# Patient Record
Sex: MALE | Race: BLACK OR AFRICAN AMERICAN | NOT HISPANIC OR LATINO | Employment: OTHER | URBAN - METROPOLITAN AREA
[De-identification: names, ages, dates, MRNs, and addresses within clinical notes are randomized per-mention and may not be internally consistent; named-entity substitution may affect disease eponyms.]

---

## 2023-10-30 ENCOUNTER — HOSPITAL ENCOUNTER (EMERGENCY)
Facility: HOSPITAL | Age: 63
Discharge: HOME | End: 2023-10-30
Attending: INTERNAL MEDICINE

## 2023-10-30 VITALS
BODY MASS INDEX: 26.66 KG/M2 | DIASTOLIC BLOOD PRESSURE: 82 MMHG | WEIGHT: 180 LBS | SYSTOLIC BLOOD PRESSURE: 159 MMHG | TEMPERATURE: 99 F | RESPIRATION RATE: 18 BRPM | OXYGEN SATURATION: 97 % | HEIGHT: 69 IN | HEART RATE: 96 BPM

## 2023-10-30 DIAGNOSIS — R06.6 INTRACTABLE HICCUPS: ICD-10-CM

## 2023-10-30 DIAGNOSIS — K52.9 ENTERITIS: Primary | ICD-10-CM

## 2023-10-30 DIAGNOSIS — K56.7 ILEUS: ICD-10-CM

## 2023-10-30 LAB
ALBUMIN SERPL-MCNC: 3.4 G/DL (ref 3.4–4.8)
ALBUMIN/GLOB SERPL: 1.1 RATIO (ref 1.1–2)
ALP SERPL-CCNC: 60 UNIT/L (ref 40–150)
ALT SERPL-CCNC: 76 UNIT/L (ref 0–55)
AMPHET UR QL SCN: NEGATIVE
APPEARANCE UR: CLEAR
AST SERPL-CCNC: 40 UNIT/L (ref 5–34)
BACTERIA #/AREA URNS AUTO: ABNORMAL /HPF
BARBITURATE SCN PRESENT UR: NEGATIVE
BASOPHILS # BLD AUTO: 0.06 X10(3)/MCL
BASOPHILS NFR BLD AUTO: 0.7 %
BENZODIAZ UR QL SCN: NEGATIVE
BILIRUB SERPL-MCNC: 0.6 MG/DL
BILIRUB UR QL STRIP.AUTO: NEGATIVE
BUN SERPL-MCNC: 12.3 MG/DL (ref 8.4–25.7)
CALCIUM SERPL-MCNC: 9.2 MG/DL (ref 8.8–10)
CANNABINOIDS UR QL SCN: NEGATIVE
CHLORIDE SERPL-SCNC: 103 MMOL/L (ref 98–107)
CO2 SERPL-SCNC: 28 MMOL/L (ref 23–31)
COCAINE UR QL SCN: NEGATIVE
COLOR UR AUTO: YELLOW
CREAT SERPL-MCNC: 1.2 MG/DL (ref 0.73–1.18)
EOSINOPHIL # BLD AUTO: 0.08 X10(3)/MCL (ref 0–0.9)
EOSINOPHIL NFR BLD AUTO: 0.9 %
ERYTHROCYTE [DISTWIDTH] IN BLOOD BY AUTOMATED COUNT: 13.2 % (ref 11.5–17)
FENTANYL UR QL SCN: NEGATIVE
GFR SERPLBLD CREATININE-BSD FMLA CKD-EPI: >60 MLS/MIN/1.73/M2
GLOBULIN SER-MCNC: 3.2 GM/DL (ref 2.4–3.5)
GLUCOSE SERPL-MCNC: 140 MG/DL (ref 82–115)
GLUCOSE UR QL STRIP.AUTO: NEGATIVE
HCT VFR BLD AUTO: 45.7 % (ref 42–52)
HGB BLD-MCNC: 15.9 G/DL (ref 14–18)
HYALINE CASTS URNS QL MICRO: ABNORMAL /LPF
IMM GRANULOCYTES # BLD AUTO: 0.29 X10(3)/MCL (ref 0–0.04)
IMM GRANULOCYTES NFR BLD AUTO: 3.3 %
INR PPP: 1.1 (ref 2–3)
KETONES UR QL STRIP.AUTO: ABNORMAL
LEUKOCYTE ESTERASE UR QL STRIP.AUTO: NEGATIVE
LIPASE SERPL-CCNC: 99 U/L
LYMPHOCYTES # BLD AUTO: 2.56 X10(3)/MCL (ref 0.6–4.6)
LYMPHOCYTES NFR BLD AUTO: 28.7 %
MAGNESIUM SERPL-MCNC: 2.3 MG/DL (ref 1.6–2.6)
MCH RBC QN AUTO: 31.4 PG (ref 27–31)
MCHC RBC AUTO-ENTMCNC: 34.8 G/DL (ref 33–36)
MCV RBC AUTO: 90.1 FL (ref 80–94)
MDMA UR QL SCN: NEGATIVE
MONOCYTES # BLD AUTO: 1.13 X10(3)/MCL (ref 0.1–1.3)
MONOCYTES NFR BLD AUTO: 12.7 %
NEUTROPHILS # BLD AUTO: 4.79 X10(3)/MCL (ref 2.1–9.2)
NEUTROPHILS NFR BLD AUTO: 53.7 %
NITRITE UR QL STRIP.AUTO: NEGATIVE
NRBC BLD AUTO-RTO: 0 %
OPIATES UR QL SCN: NEGATIVE
PCP UR QL: NEGATIVE
PH UR STRIP.AUTO: 6 [PH]
PH UR: 6 [PH] (ref 3–11)
PLATELET # BLD AUTO: 248 X10(3)/MCL (ref 130–400)
PMV BLD AUTO: 9.5 FL (ref 7.4–10.4)
POTASSIUM SERPL-SCNC: 3.7 MMOL/L (ref 3.5–5.1)
PROT SERPL-MCNC: 6.6 GM/DL (ref 5.8–7.6)
PROT UR QL STRIP.AUTO: NEGATIVE
PROTHROMBIN TIME: 13.9 SECONDS (ref 11.7–14.5)
RBC # BLD AUTO: 5.07 X10(6)/MCL (ref 4.7–6.1)
RBC #/AREA URNS AUTO: ABNORMAL /HPF
RBC UR QL AUTO: ABNORMAL
SODIUM SERPL-SCNC: 138 MMOL/L (ref 136–145)
SP GR UR STRIP.AUTO: 1.02 (ref 1–1.03)
SQUAMOUS #/AREA URNS AUTO: ABNORMAL /HPF
TROPONIN I SERPL-MCNC: <0.01 NG/ML (ref 0–0.04)
UROBILINOGEN UR STRIP-ACNC: 1
WBC # SPEC AUTO: 8.91 X10(3)/MCL (ref 4.5–11.5)
WBC #/AREA URNS AUTO: ABNORMAL /HPF

## 2023-10-30 PROCEDURE — 99285 EMERGENCY DEPT VISIT HI MDM: CPT | Mod: 25

## 2023-10-30 PROCEDURE — 85610 PROTHROMBIN TIME: CPT | Performed by: INTERNAL MEDICINE

## 2023-10-30 PROCEDURE — 63600175 PHARM REV CODE 636 W HCPCS: Performed by: INTERNAL MEDICINE

## 2023-10-30 PROCEDURE — 83735 ASSAY OF MAGNESIUM: CPT | Performed by: INTERNAL MEDICINE

## 2023-10-30 PROCEDURE — 96374 THER/PROPH/DIAG INJ IV PUSH: CPT

## 2023-10-30 PROCEDURE — 80053 COMPREHEN METABOLIC PANEL: CPT | Performed by: INTERNAL MEDICINE

## 2023-10-30 PROCEDURE — 83690 ASSAY OF LIPASE: CPT | Performed by: INTERNAL MEDICINE

## 2023-10-30 PROCEDURE — 80307 DRUG TEST PRSMV CHEM ANLYZR: CPT | Performed by: INTERNAL MEDICINE

## 2023-10-30 PROCEDURE — 81001 URINALYSIS AUTO W/SCOPE: CPT | Mod: XB | Performed by: INTERNAL MEDICINE

## 2023-10-30 PROCEDURE — 84484 ASSAY OF TROPONIN QUANT: CPT | Performed by: INTERNAL MEDICINE

## 2023-10-30 PROCEDURE — 25000003 PHARM REV CODE 250: Performed by: INTERNAL MEDICINE

## 2023-10-30 PROCEDURE — 85025 COMPLETE CBC W/AUTO DIFF WBC: CPT | Performed by: INTERNAL MEDICINE

## 2023-10-30 RX ORDER — PROMETHAZINE HYDROCHLORIDE 25 MG/1
25 TABLET ORAL EVERY 6 HOURS PRN
Qty: 15 TABLET | Refills: 0 | Status: SHIPPED | OUTPATIENT
Start: 2023-10-30

## 2023-10-30 RX ORDER — LABETALOL HCL 20 MG/4 ML
10 SYRINGE (ML) INTRAVENOUS ONCE
Status: DISCONTINUED | OUTPATIENT
Start: 2023-10-30 | End: 2023-10-30

## 2023-10-30 RX ORDER — ONDANSETRON 4 MG/1
4 TABLET, ORALLY DISINTEGRATING ORAL EVERY 6 HOURS PRN
Qty: 15 TABLET | Refills: 0 | Status: SHIPPED | OUTPATIENT
Start: 2023-10-30

## 2023-10-30 RX ORDER — METOCLOPRAMIDE HYDROCHLORIDE 5 MG/ML
5 INJECTION INTRAMUSCULAR; INTRAVENOUS
Status: COMPLETED | OUTPATIENT
Start: 2023-10-30 | End: 2023-10-30

## 2023-10-30 RX ADMIN — SODIUM CHLORIDE 1000 ML: 9 INJECTION, SOLUTION INTRAVENOUS at 08:10

## 2023-10-30 RX ADMIN — METOCLOPRAMIDE HYDROCHLORIDE 5 MG: 5 INJECTION INTRAMUSCULAR; INTRAVENOUS at 09:10

## 2023-10-30 NOTE — ED PROVIDER NOTES
"     Source of History:  Patient, no limitations    Chief complaint:  Diarrhea (C/o "black" diarrhea with some vomiting and hiccoughs X a few days. )      HPI:  Scottie Denson is a 63 y.o. male presenting with Diarrhea (C/o "black" diarrhea with some vomiting and hiccoughs X a few days. )       64yo M without hx, presents for N/V/D for last few days associated with generalized abd pain and bloating  Patient presents for evaluation of abdominal pain. Onset of symptoms was abrupt starting a few days ago with stable course since that time. The pain is located diffusely without radiation. The pain is rated as mild. The pain is made worse by food and vomiting and is relieved by noting. The patient also complains of anorexia, diarrhea, and vomiting. The patient denies fever. The past workup has included none. Home care consisted of none.        Review of Systems   Constitutional symptoms:  Negative except as documented in HPI.   Skin symptoms:  Negative except as documented in HPI.   HEENT symptoms:  Negative except as documented in HPI.   Respiratory symptoms:  Negative except as documented in HPI.   Cardiovascular symptoms:  Negative except as documented in HPI.   Gastrointestinal symptoms:  Negative except as documented in HPI.    Genitourinary symptoms:  Negative except as documented in HPI.   Musculoskeletal symptoms:  Negative except as documented in HPI.   Neurologic symptoms:  Negative except as documented in HPI.   Psychiatric symptoms:  Negative except as documented in HPI.   Allergy/immunologic symptoms:  Negative except as documented in HPI.             Additional review of systems information: All other systems reviewed and otherwise negative.      Review of patient's allergies indicates:  No Known Allergies    PMH:  As per HPI and below:    No past medical history on file.     No family history on file.    No past surgical history on file.         There is no problem list on file for this patient.   " "    Physical Exam:    BP (!) 159/82 (BP Location: Left arm, Patient Position: Lying)   Pulse 96   Temp 98.8 °F (37.1 °C) (Oral)   Resp 18   Ht 5' 9" (1.753 m)   Wt 81.6 kg (180 lb)   SpO2 97%   BMI 26.58 kg/m²     Nursing note and vital signs reviewed.    General:  Alert, no acute distress.   Skin: Normal for Ethnic Origin, No cyanosis  HEENT: Normocephalic and atraumatic, Vision unchanged, Pupils symmetric, No icterus , Nasal mucosa is pink and moist  Cardiovascular:  Regular rate and rhythm, No edema  Chest Wall: No deformity, equal chest rise  Respiratory:  Lungs are clear to auscultation, respirations are non-labored.    Musculoskeletal:  No deformity, Normal perfusion to all extremities  Gastrointestinal:  Soft, mildly distended, hypoactive BS  Neurological:  Alert and oriented, normal motor observed, normal speech observed.    Psychiatric:  Cooperative, appropriate mood & affect.        Labs that have been ordered have been independently reviewed and interpreted by myself.     Old Chart Reviewed.      Initial Impression/ Differential Dx:  GERD, intestinal spasm, gastroenteritis, gastritis, ulcer, cholecystitis, cholelithiasis, gallstones, pancreatitis, ileus, small bowel obstruction, appendicitis, diverticulitis, colitis, constipation, intestinal gas pain.      MDM:      Reviewed Nurses Note.    Reviewed Pertinent old records.    Orders Placed This Encounter    X-Ray Abdomen Flat And Erect    CT Abdomen Pelvis  Without Contrast    CBC Auto Differential    Comprehensive Metabolic Panel    Protime-INR    Lipase    Magnesium    Troponin I    CBC with Differential    Urinalysis, Reflex to Urine Culture    Drug Screen, Urine    Urinalysis, Microscopic    Insert peripheral IV    sodium chloride 0.9% bolus 1,000 mL 1,000 mL    metoclopramide HCl injection 5 mg    ondansetron (ZOFRAN-ODT) 4 MG TbDL    promethazine (PHENERGAN) 25 MG tablet                    Labs Reviewed   COMPREHENSIVE METABOLIC PANEL - " Abnormal; Notable for the following components:       Result Value    Glucose Level 140 (*)     Creatinine 1.20 (*)     Alanine Aminotransferase 76 (*)     Aspartate Aminotransferase 40 (*)     All other components within normal limits   PROTIME-INR - Abnormal; Notable for the following components:    INR 1.1 (*)     All other components within normal limits   LIPASE - Abnormal; Notable for the following components:    Lipase Level 99 (*)     All other components within normal limits   CBC WITH DIFFERENTIAL - Abnormal; Notable for the following components:    MCH 31.4 (*)     IG# 0.29 (*)     All other components within normal limits   URINALYSIS, REFLEX TO URINE CULTURE - Abnormal; Notable for the following components:    Ketones, UA Trace (*)     Blood, UA Small (*)     All other components within normal limits   URINALYSIS, MICROSCOPIC - Abnormal; Notable for the following components:    Hyaline Casts, UA Rare (*)     All other components within normal limits   MAGNESIUM - Normal   TROPONIN I - Normal   DRUG SCREEN, URINE (BEAKER) - Normal    Narrative:     Cut off concentrations:    Amphetamines - 1000 ng/ml  Barbiturates - 200 ng/ml  Benzodiazepine - 200 ng/ml  Cannabinoids (THC) - 50 ng/ml  Cocaine - 300 ng/ml  Fentanyl - 1.0 ng/ml  MDMA - 500 ng/ml  Opiates - 300 ng/ml   Phencyclidine (PCP) - 25 ng/ml    Specimen submitted for drug analysis and tested for pH and specific gravity in order to evaluate sample integrity. Suspect tampering if specific gravity is <1.003 and/or pH is not within the range of 4.5 - 8.0  False negatives may result form substances such as bleach added to urine.  False positives may result for the presence of a substance with similar chemical structure to the drug or its metabolite.    This test provides only a PRELIMINARY analytical test result. A more specific alternate chemical method must be used in order to obtain a confirmed analytical result. Gas chromatography/mass spectrometry  (GC/MS) is the preferred confirmatory method. Other chemical confirmation methods are available. Clinical consideration and professional judgement should be applied to any drug of abuse test result, particularly when preliminary positive results are used.    Positive results will be confirmed only at the physicians request. Unconfirmed screening results are to be used only for medical purposes (treatment).        CBC W/ AUTO DIFFERENTIAL    Narrative:     The following orders were created for panel order CBC Auto Differential.  Procedure                               Abnormality         Status                     ---------                               -----------         ------                     CBC with Differential[[id number]]        Abnormal            Final result                 Please view results for these tests on the individual orders.          CT Abdomen Pelvis  Without Contrast   Final Result      Dilated air-fluid filled loops of small bowel with mild wall thickening, possibly reflecting enteritis.  There are decompressed loops of small bowel distally with transition in the upper pelvis in the midline, suggesting some degree of small-bowel obstruction.         Electronically signed by: Brian Jay MD   Date:    10/30/2023   Time:    09:05      X-Ray Abdomen Flat And Erect    (Results Pending)        Admission on 10/30/2023   Component Date Value Ref Range Status    Sodium Level 10/30/2023 138  136 - 145 mmol/L Final    Potassium Level 10/30/2023 3.7  3.5 - 5.1 mmol/L Final    Chloride 10/30/2023 103  98 - 107 mmol/L Final    Carbon Dioxide 10/30/2023 28  23 - 31 mmol/L Final    Glucose Level 10/30/2023 140 (H)  82 - 115 mg/dL Final    Blood Urea Nitrogen 10/30/2023 12.3  8.4 - 25.7 mg/dL Final    Creatinine 10/30/2023 1.20 (H)  0.73 - 1.18 mg/dL Final    Calcium Level Total 10/30/2023 9.2  8.8 - 10.0 mg/dL Final    Protein Total 10/30/2023 6.6  5.8 - 7.6 gm/dL Final    Albumin Level 10/30/2023 3.4   3.4 - 4.8 g/dL Final    Globulin 10/30/2023 3.2  2.4 - 3.5 gm/dL Final    Albumin/Globulin Ratio 10/30/2023 1.1  1.1 - 2.0 ratio Final    Bilirubin Total 10/30/2023 0.6  <=1.5 mg/dL Final    Alkaline Phosphatase 10/30/2023 60  40 - 150 unit/L Final    Alanine Aminotransferase 10/30/2023 76 (H)  0 - 55 unit/L Final    Aspartate Aminotransferase 10/30/2023 40 (H)  5 - 34 unit/L Final    eGFR 10/30/2023 >60  mls/min/1.73/m2 Final    PT 10/30/2023 13.9  11.7 - 14.5 seconds Final    INR 10/30/2023 1.1 (L)  2.0 - 3.0 Final    Lipase Level 10/30/2023 99 (H)  <=60 U/L Final    Magnesium Level 10/30/2023 2.30  1.60 - 2.60 mg/dL Final    Troponin-I 10/30/2023 <0.010  0.000 - 0.045 ng/mL Final    WBC 10/30/2023 8.91  4.50 - 11.50 x10(3)/mcL Final    RBC 10/30/2023 5.07  4.70 - 6.10 x10(6)/mcL Final    Hgb 10/30/2023 15.9  14.0 - 18.0 g/dL Final    Hct 10/30/2023 45.7  42.0 - 52.0 % Final    MCV 10/30/2023 90.1  80.0 - 94.0 fL Final    MCH 10/30/2023 31.4 (H)  27.0 - 31.0 pg Final    MCHC 10/30/2023 34.8  33.0 - 36.0 g/dL Final    RDW 10/30/2023 13.2  11.5 - 17.0 % Final    Platelet 10/30/2023 248  130 - 400 x10(3)/mcL Final    MPV 10/30/2023 9.5  7.4 - 10.4 fL Final    Neut % 10/30/2023 53.7  % Final    Lymph % 10/30/2023 28.7  % Final    Mono % 10/30/2023 12.7  % Final    Eos % 10/30/2023 0.9  % Final    Basophil % 10/30/2023 0.7  % Final    Lymph # 10/30/2023 2.56  0.6 - 4.6 x10(3)/mcL Final    Neut # 10/30/2023 4.79  2.1 - 9.2 x10(3)/mcL Final    Mono # 10/30/2023 1.13  0.1 - 1.3 x10(3)/mcL Final    Eos # 10/30/2023 0.08  0 - 0.9 x10(3)/mcL Final    Baso # 10/30/2023 0.06  <=0.2 x10(3)/mcL Final    IG# 10/30/2023 0.29 (H)  0 - 0.04 x10(3)/mcL Final    IG% 10/30/2023 3.3  % Final    NRBC% 10/30/2023 0.0  % Final    Color, UA 10/30/2023 Yellow  Yellow, Light-Yellow, Dark Yellow, [place], Straw Final    Appearance, UA 10/30/2023 Clear  Clear Final    Specific Gravity, UA 10/30/2023 1.025  1.005 - 1.030 Final    pH, UA 10/30/2023  6.0  5.0 - 8.5 Final    Protein, UA 10/30/2023 Negative  Negative Final    Glucose, UA 10/30/2023 Negative  Negative, Normal Final    Ketones, UA 10/30/2023 Trace (A)  Negative Final    Blood, UA 10/30/2023 Small (A)  Negative Final    Bilirubin, UA 10/30/2023 Negative  Negative Final    Urobilinogen, UA 10/30/2023 1.0  0.2, 1.0, Normal Final    Nitrites, UA 10/30/2023 Negative  Negative Final    Leukocyte Esterase, UA 10/30/2023 Negative  Negative Final    Amphetamines, Urine 10/30/2023 Negative  Negative Final    Barbituates, Urine 10/30/2023 Negative  Negative Final    Benzodiazepine, Urine 10/30/2023 Negative  Negative Final    Cannabinoids, Urine 10/30/2023 Negative  Negative Final    Cocaine, Urine 10/30/2023 Negative  Negative Final    Fentanyl, Urine 10/30/2023 Negative  Negative Final    MDMA, Urine 10/30/2023 Negative  Negative Final    Opiates, Urine 10/30/2023 Negative  Negative Final    Phencyclidine, Urine 10/30/2023 Negative  Negative Final    pH, Urine 10/30/2023 6.0  3.0 - 11.0 Final    Bacteria, UA 10/30/2023 Rare  None Seen, Rare, Occasional /HPF Final    Hyaline Casts, UA 10/30/2023 Rare (A)  None Seen /LPF Final    RBC, UA 10/30/2023 0-2  None Seen, 0-2, 3-5, 0-5 /HPF Final    WBC, UA 10/30/2023 0-2  None Seen, 0-2, 3-5, 0-5 /HPF Final    Squamous Epithelial Cells, UA 10/30/2023 None Seen  None Seen, Rare, Occasional, Occ /HPF Final       Imaging Results              CT Abdomen Pelvis  Without Contrast (Final result)  Result time 10/30/23 09:05:06      Final result by Brian Jay MD (10/30/23 09:05:06)                   Impression:      Dilated air-fluid filled loops of small bowel with mild wall thickening, possibly reflecting enteritis.  There are decompressed loops of small bowel distally with transition in the upper pelvis in the midline, suggesting some degree of small-bowel obstruction.      Electronically signed by: Brian Jay MD  Date:    10/30/2023  Time:    09:05                Narrative:    EXAMINATION:  CT of the abdomen pelvis without contrast    CLINICAL HISTORY:  Abdominal pain, diarrhea    TECHNIQUE:  Routine CT of the abdomen pelvis performed without intravenous contrast Routine CT of the abdomen pelvis performed without intravenous contrast    Total DLP: 289 mGy.cm    Automatic exposure control was utilized to reduce the patient's dose    COMPARISON:  None    FINDINGS:  The examination is limited by patient motion.    The visualized lung bases are clear.    Evaluation of solid organs is limited without intravenous contrast.  The noncontrast images of the liver, spleen, pancreas, gallbladder, and adrenal glands unremarkable.  There are bilateral fluid attenuating hypodensities in both kidneys, most likely cysts.  No hydronephrosis or hydroureter.  No renal calculus or ureteral calculus seen.    There are mild atherosclerotic calcifications of the abdominal aorta is branches without aneurysmal dilatation.  No abdominopelvic adenopathy.  No free air, free fluid, fluid collection.  Bladder contours are normal.    The appendix appears normal.    There is a small moderate amount of retained stool in the colon.    There there are dilated air-fluid filled loops of small bowel centrally in the left hemiabdomen with mild wall thickening.  There is transition to smaller caliber and slightly decompressed loops of small bowel in the midline upper pelvis, raising possibility for small bowel obstruction.    No osseous destructive process.                                       X-Ray Abdomen Flat And Erect (In process)                                    ED Course as of 10/30/23 0954   Mon Oct 30, 2023   0813 Hemoglobin: 15.9 [MP]   0813 Hematocrit: 45.7 [MP]   0813 Pulse(!): 116 [MP]   0813 BP(!): 185/95 [MP]   0833 ALT(!): 76 [MP]   0833 AST(!): 40 [MP]   0927 Offered admission vs home with bowel rest. Pt says he will put himself on a liquid diet and if not improved will come back to ED [MP]    0943 BP(!): 159/82 [MP]   0943 Pulse: 96 [MP]      ED Course User Index  [MP] Flako Andrade DO                        Diagnostic Impression:    1. Enteritis    2. Ileus    3. Intractable hiccups         ED Disposition Condition    Discharge Stable             Follow-up Information       [facility] - Emergency Dept.    Specialty: Emergency Medicine  Why: If symptoms worsen  Contact information:  [address]  [facility] [id number] [phone]                            ED Prescriptions       Medication Sig Dispense Start Date End Date Auth. Provider    ondansetron (ZOFRAN-ODT) 4 MG TbDL Take 1 tablet (4 mg total) by mouth every 6 (six) hours as needed (nausea). 15 tablet 10/30/2023 -- Flako Andrade DO    promethazine (PHENERGAN) 25 MG tablet Take 1 tablet (25 mg total) by mouth every 6 (six) hours as needed for Nausea. 15 tablet 10/30/2023 -- Flako Andrade DO          Follow-up Information       Follow up With Specialties Details Why Contact Info    [facility] - Emergency Dept Emergency Medicine  If symptoms worsen [address]  [facility] [id number] [phone]             Flako Andrade DO  10/30/23 0996